# Patient Record
Sex: MALE | Race: BLACK OR AFRICAN AMERICAN | NOT HISPANIC OR LATINO | Employment: OTHER | URBAN - METROPOLITAN AREA
[De-identification: names, ages, dates, MRNs, and addresses within clinical notes are randomized per-mention and may not be internally consistent; named-entity substitution may affect disease eponyms.]

---

## 2021-11-27 ENCOUNTER — HOSPITAL ENCOUNTER (EMERGENCY)
Facility: HOSPITAL | Age: 61
Discharge: HOME | End: 2021-11-27
Attending: EMERGENCY MEDICINE
Payer: MEDICARE

## 2021-11-27 VITALS
OXYGEN SATURATION: 99 % | TEMPERATURE: 99 F | HEIGHT: 65 IN | SYSTOLIC BLOOD PRESSURE: 169 MMHG | BODY MASS INDEX: 31.99 KG/M2 | RESPIRATION RATE: 18 BRPM | WEIGHT: 192 LBS | HEART RATE: 85 BPM | DIASTOLIC BLOOD PRESSURE: 89 MMHG

## 2021-11-27 DIAGNOSIS — M79.89 LEG SWELLING: ICD-10-CM

## 2021-11-27 DIAGNOSIS — R60.9 SWELLING: ICD-10-CM

## 2021-11-27 DIAGNOSIS — M79.89 RIGHT LEG SWELLING: ICD-10-CM

## 2021-11-27 LAB
ALBUMIN SERPL-MCNC: 3.5 G/DL (ref 3.3–5.5)
ALLENS TEST: ABNORMAL
ALP SERPL-CCNC: 76 U/L (ref 42–141)
BILIRUB SERPL-MCNC: 0.4 MG/DL (ref 0.2–1.6)
BUN SERPL-MCNC: 37 MG/DL (ref 7–22)
CALCIUM SERPL-MCNC: 9.7 MG/DL (ref 8–10.3)
CHLORIDE SERPL-SCNC: 107 MMOL/L (ref 98–108)
CREAT SERPL-MCNC: 2.5 MG/DL (ref 0.6–1.2)
CRP SERPL-MCNC: 71.5 MG/L (ref 0–8.2)
ERYTHROCYTE [SEDIMENTATION RATE] IN BLOOD BY WESTERGREN METHOD: 66 MM/HR (ref 0–10)
GLUCOSE SERPL-MCNC: 130 MG/DL (ref 73–118)
HCO3 UR-SCNC: 22.8 MMOL/L (ref 24–28)
LDH SERPL L TO P-CCNC: 0.84 MMOL/L (ref 0.5–2.2)
PCO2 BLDA: 38.1 MMHG (ref 35–45)
PH SMN: 7.38 [PH] (ref 7.35–7.45)
PO2 BLDA: 49 MMHG (ref 40–60)
POC ALT (SGPT): 27 U/L (ref 10–47)
POC AST (SGOT): 29 U/L (ref 11–38)
POC B-TYPE NATRIURETIC PEPTIDE: 11.5 PG/ML (ref 0–100)
POC BE: -2 MMOL/L
POC CARDIAC TROPONIN I: 0 NG/ML
POC D-DI: 531 NG/ML (ref 0–450)
POC SATURATED O2: 84 % (ref 95–100)
POC TCO2: 22 MMOL/L (ref 18–33)
POC TCO2: 24 MMOL/L (ref 24–29)
POTASSIUM BLD-SCNC: 5.5 MMOL/L (ref 3.6–5.1)
PROTEIN, POC: 8.1 G/DL (ref 6.4–8.1)
SAMPLE: ABNORMAL
SAMPLE: NORMAL
SITE: ABNORMAL
SODIUM BLD-SCNC: 140 MMOL/L (ref 128–145)

## 2021-11-27 PROCEDURE — 93005 ELECTROCARDIOGRAM TRACING: CPT | Mod: ER

## 2021-11-27 PROCEDURE — 87040 BLOOD CULTURE FOR BACTERIA: CPT | Performed by: EMERGENCY MEDICINE

## 2021-11-27 PROCEDURE — 80053 COMPREHEN METABOLIC PANEL: CPT | Mod: ER

## 2021-11-27 PROCEDURE — 85652 RBC SED RATE AUTOMATED: CPT | Performed by: EMERGENCY MEDICINE

## 2021-11-27 PROCEDURE — 86140 C-REACTIVE PROTEIN: CPT | Performed by: EMERGENCY MEDICINE

## 2021-11-27 PROCEDURE — 83880 ASSAY OF NATRIURETIC PEPTIDE: CPT | Mod: ER

## 2021-11-27 PROCEDURE — 99284 EMERGENCY DEPT VISIT MOD MDM: CPT | Mod: 25,ER

## 2021-11-27 PROCEDURE — 93010 ELECTROCARDIOGRAM REPORT: CPT | Mod: ,,, | Performed by: INTERNAL MEDICINE

## 2021-11-27 PROCEDURE — 84484 ASSAY OF TROPONIN QUANT: CPT | Mod: ER

## 2021-11-27 PROCEDURE — 25000003 PHARM REV CODE 250: Mod: ER | Performed by: EMERGENCY MEDICINE

## 2021-11-27 PROCEDURE — 82803 BLOOD GASES ANY COMBINATION: CPT | Mod: ER

## 2021-11-27 PROCEDURE — 85379 FIBRIN DEGRADATION QUANT: CPT | Mod: ER

## 2021-11-27 PROCEDURE — 93010 EKG 12-LEAD: ICD-10-PCS | Mod: ,,, | Performed by: INTERNAL MEDICINE

## 2021-11-27 PROCEDURE — 85025 COMPLETE CBC W/AUTO DIFF WBC: CPT | Mod: ER

## 2021-11-27 RX ORDER — AMLODIPINE BESYLATE 10 MG/1
10 TABLET ORAL
COMMUNITY
Start: 2021-11-07 | End: 2022-11-07

## 2021-11-27 RX ORDER — HYDRALAZINE HYDROCHLORIDE 25 MG/1
25 TABLET, FILM COATED ORAL
COMMUNITY
Start: 2021-11-06 | End: 2022-11-06

## 2021-11-27 RX ORDER — SULFAMETHOXAZOLE AND TRIMETHOPRIM 800; 160 MG/1; MG/1
1 TABLET ORAL 2 TIMES DAILY
Qty: 20 TABLET | Refills: 0 | Status: SHIPPED | OUTPATIENT
Start: 2021-11-27 | End: 2021-12-07

## 2021-11-27 RX ORDER — TRAMADOL HYDROCHLORIDE 50 MG/1
50 TABLET ORAL EVERY 6 HOURS PRN
Qty: 12 TABLET | Refills: 0 | Status: SHIPPED | OUTPATIENT
Start: 2021-11-27

## 2021-11-27 RX ORDER — AMOXICILLIN AND CLAVULANATE POTASSIUM 875; 125 MG/1; MG/1
1 TABLET, FILM COATED ORAL 2 TIMES DAILY
Qty: 20 TABLET | Refills: 0 | Status: SHIPPED | OUTPATIENT
Start: 2021-11-27 | End: 2021-12-07

## 2021-11-27 RX ORDER — TRAMADOL HYDROCHLORIDE 50 MG/1
50 TABLET ORAL
Status: COMPLETED | OUTPATIENT
Start: 2021-11-27 | End: 2021-11-27

## 2021-11-27 RX ORDER — CARVEDILOL 12.5 MG/1
12.5 TABLET ORAL
COMMUNITY

## 2021-11-27 RX ORDER — DICLOFENAC SODIUM 10 MG/G
GEL TOPICAL
Qty: 100 G | Refills: 0 | Status: SHIPPED | OUTPATIENT
Start: 2021-11-27

## 2021-11-27 RX ADMIN — TRAMADOL HYDROCHLORIDE 50 MG: 50 TABLET, FILM COATED ORAL at 03:11

## 2021-12-01 LAB
BACTERIA BLD CULT: NORMAL
BACTERIA BLD CULT: NORMAL

## 2024-11-24 ENCOUNTER — HOSPITAL ENCOUNTER (EMERGENCY)
Facility: HOSPITAL | Age: 64
Discharge: HOME | End: 2024-11-25
Attending: EMERGENCY MEDICINE
Payer: MEDICARE

## 2024-11-24 DIAGNOSIS — M19.90 INFLAMMATORY ARTHRITIS: Primary | ICD-10-CM

## 2024-11-24 DIAGNOSIS — N18.9 CHRONIC KIDNEY DISEASE, UNSPECIFIED CKD STAGE: ICD-10-CM

## 2024-11-24 DIAGNOSIS — M25.561 RIGHT KNEE PAIN: ICD-10-CM

## 2024-11-24 DIAGNOSIS — I10 ELEVATED SYSTOLIC BLOOD PRESSURE READING WITH DIAGNOSIS OF HYPERTENSION: ICD-10-CM

## 2024-11-24 LAB
ALBUMIN SERPL-MCNC: 3.5 G/DL (ref 3.3–5.5)
ALLENS TEST: ABNORMAL
ALP SERPL-CCNC: 67 U/L (ref 42–141)
BILIRUB SERPL-MCNC: 1.1 MG/DL (ref 0.2–1.6)
BUN SERPL-MCNC: 30 MG/DL (ref 7–22)
CALCIUM SERPL-MCNC: 9.4 MG/DL (ref 8–10.3)
CHLORIDE SERPL-SCNC: 104 MMOL/L (ref 98–108)
CREAT SERPL-MCNC: 3.7 MG/DL (ref 0.6–1.2)
CRP SERPL-MCNC: 106.9 MG/L (ref 0–8.2)
ERYTHROCYTE [SEDIMENTATION RATE] IN BLOOD BY PHOTOMETRIC METHOD: 77 MM/HR (ref 0–23)
GLUCOSE SERPL-MCNC: 99 MG/DL (ref 73–118)
HCO3 UR-SCNC: 21.1 MMOL/L (ref 24–28)
HCT, POC: NORMAL
HGB, POC: NORMAL (ref 14–18)
LDH SERPL L TO P-CCNC: 1.27 MMOL/L (ref 0.5–2.2)
MCH, POC: NORMAL
MCHC, POC: NORMAL
MCV, POC: NORMAL
MPV, POC: NORMAL
PCO2 BLDA: 37.4 MMHG (ref 35–45)
PH SMN: 7.36 [PH] (ref 7.35–7.45)
PO2 BLDA: 30 MMHG (ref 40–60)
POC ALT (SGPT): 20 U/L (ref 10–47)
POC AST (SGOT): 25 U/L (ref 11–38)
POC BE: -4 MMOL/L
POC PLATELET COUNT: NORMAL
POC SATURATED O2: 56 % (ref 95–100)
POC TCO2: 22 MMOL/L (ref 24–29)
POC TCO2: 24 MMOL/L (ref 18–33)
POCT GLUCOSE: 88 MG/DL (ref 70–110)
POTASSIUM BLD-SCNC: 5.6 MMOL/L (ref 3.6–5.1)
PROTEIN, POC: 7.6 G/DL (ref 6.4–8.1)
RBC, POC: NORMAL
RDW, POC: NORMAL
SAMPLE: ABNORMAL
SITE: ABNORMAL
SODIUM BLD-SCNC: 138 MMOL/L (ref 128–145)
URATE SERPL-MCNC: 9.6 MG/DL (ref 3.4–7)
WBC, POC: NORMAL

## 2024-11-24 PROCEDURE — 84550 ASSAY OF BLOOD/URIC ACID: CPT | Performed by: EMERGENCY MEDICINE

## 2024-11-24 PROCEDURE — 86140 C-REACTIVE PROTEIN: CPT | Performed by: EMERGENCY MEDICINE

## 2024-11-24 PROCEDURE — 80053 COMPREHEN METABOLIC PANEL: CPT | Mod: ER

## 2024-11-24 PROCEDURE — 99285 EMERGENCY DEPT VISIT HI MDM: CPT | Mod: 25,ER

## 2024-11-24 PROCEDURE — 96374 THER/PROPH/DIAG INJ IV PUSH: CPT | Mod: ER

## 2024-11-24 PROCEDURE — 82803 BLOOD GASES ANY COMBINATION: CPT | Mod: ER

## 2024-11-24 PROCEDURE — 82962 GLUCOSE BLOOD TEST: CPT | Mod: ER

## 2024-11-24 PROCEDURE — 85025 COMPLETE CBC W/AUTO DIFF WBC: CPT | Mod: ER

## 2024-11-24 PROCEDURE — 96375 TX/PRO/DX INJ NEW DRUG ADDON: CPT | Mod: ER

## 2024-11-24 PROCEDURE — 85652 RBC SED RATE AUTOMATED: CPT | Performed by: EMERGENCY MEDICINE

## 2024-11-24 PROCEDURE — 25000003 PHARM REV CODE 250: Mod: ER | Performed by: EMERGENCY MEDICINE

## 2024-11-24 PROCEDURE — 63600175 PHARM REV CODE 636 W HCPCS: Mod: ER | Performed by: EMERGENCY MEDICINE

## 2024-11-24 RX ORDER — LIDOCAINE HYDROCHLORIDE 10 MG/ML
10 INJECTION, SOLUTION INFILTRATION; PERINEURAL
Status: COMPLETED | OUTPATIENT
Start: 2024-11-24 | End: 2024-11-25

## 2024-11-24 RX ORDER — TRIAMCINOLONE ACETONIDE 40 MG/ML
40 INJECTION, SUSPENSION INTRA-ARTICULAR; INTRAMUSCULAR
Status: COMPLETED | OUTPATIENT
Start: 2024-11-24 | End: 2024-11-25

## 2024-11-24 RX ORDER — AMLODIPINE BESYLATE 5 MG/1
10 TABLET ORAL
Status: COMPLETED | OUTPATIENT
Start: 2024-11-24 | End: 2024-11-24

## 2024-11-24 RX ORDER — HYDRALAZINE HYDROCHLORIDE 20 MG/ML
10 INJECTION INTRAMUSCULAR; INTRAVENOUS
Status: COMPLETED | OUTPATIENT
Start: 2024-11-24 | End: 2024-11-24

## 2024-11-24 RX ORDER — MORPHINE SULFATE 4 MG/ML
8 INJECTION, SOLUTION INTRAMUSCULAR; INTRAVENOUS
Status: COMPLETED | OUTPATIENT
Start: 2024-11-24 | End: 2024-11-24

## 2024-11-24 RX ORDER — DEXAMETHASONE SODIUM PHOSPHATE 4 MG/ML
12 INJECTION, SOLUTION INTRA-ARTICULAR; INTRALESIONAL; INTRAMUSCULAR; INTRAVENOUS; SOFT TISSUE
Status: COMPLETED | OUTPATIENT
Start: 2024-11-24 | End: 2024-11-24

## 2024-11-24 RX ADMIN — MORPHINE SULFATE 8 MG: 4 INJECTION INTRAVENOUS at 09:11

## 2024-11-24 RX ADMIN — DEXAMETHASONE SODIUM PHOSPHATE 12 MG: 4 INJECTION INTRA-ARTICULAR; INTRALESIONAL; INTRAMUSCULAR; INTRAVENOUS; SOFT TISSUE at 09:11

## 2024-11-24 RX ADMIN — HYDRALAZINE HYDROCHLORIDE 10 MG: 20 INJECTION INTRAMUSCULAR; INTRAVENOUS at 09:11

## 2024-11-24 RX ADMIN — AMLODIPINE BESYLATE 10 MG: 5 TABLET ORAL at 09:11

## 2024-11-24 NOTE — Clinical Note
Shanell Akhtar accompanied their spouse to the emergency department on 11/24/2024. They may return to work on 11/26/2024.      If you have any questions or concerns, please don't hesitate to call.      Navya Holland MD

## 2024-11-24 NOTE — Clinical Note
Shanell Akhtar accompanied their spouse to the emergency department on 11/24/2024. They may return to work on 11/26/2024.      If you have any questions or concerns, please don't hesitate to call.      Navya Holland RN

## 2024-11-25 VITALS
WEIGHT: 192 LBS | DIASTOLIC BLOOD PRESSURE: 96 MMHG | RESPIRATION RATE: 20 BRPM | OXYGEN SATURATION: 100 % | SYSTOLIC BLOOD PRESSURE: 195 MMHG | TEMPERATURE: 100 F | HEART RATE: 87 BPM | BODY MASS INDEX: 31.99 KG/M2 | HEIGHT: 65 IN

## 2024-11-25 LAB
APPEARANCE FLD: NORMAL
BODY FLD TYPE: NORMAL
BODY FLD TYPE: NORMAL
COLOR FLD: YELLOW
CRYSTALS FLD MICRO: NEGATIVE
LYMPHOCYTES NFR FLD MANUAL: 4 %
NEUTROPHILS NFR FLD MANUAL: 96 %
WBC # FLD: NORMAL /CU MM

## 2024-11-25 PROCEDURE — 87075 CULTR BACTERIA EXCEPT BLOOD: CPT | Performed by: EMERGENCY MEDICINE

## 2024-11-25 PROCEDURE — 20610 DRAIN/INJ JOINT/BURSA W/O US: CPT | Mod: RT,ER

## 2024-11-25 PROCEDURE — 87205 SMEAR GRAM STAIN: CPT | Performed by: EMERGENCY MEDICINE

## 2024-11-25 PROCEDURE — 89060 EXAM SYNOVIAL FLUID CRYSTALS: CPT | Performed by: EMERGENCY MEDICINE

## 2024-11-25 PROCEDURE — 63600175 PHARM REV CODE 636 W HCPCS: Mod: ER | Performed by: EMERGENCY MEDICINE

## 2024-11-25 PROCEDURE — 89051 BODY FLUID CELL COUNT: CPT | Performed by: EMERGENCY MEDICINE

## 2024-11-25 PROCEDURE — 96375 TX/PRO/DX INJ NEW DRUG ADDON: CPT | Mod: ER

## 2024-11-25 PROCEDURE — 87070 CULTURE OTHR SPECIMN AEROBIC: CPT | Performed by: EMERGENCY MEDICINE

## 2024-11-25 RX ORDER — DICLOFENAC SODIUM 10 MG/G
2 GEL TOPICAL 4 TIMES DAILY
Qty: 100 G | Refills: 0 | Status: SHIPPED | OUTPATIENT
Start: 2024-11-25 | End: 2024-12-25

## 2024-11-25 RX ORDER — PREDNISONE 20 MG/1
40 TABLET ORAL DAILY
Qty: 10 TABLET | Refills: 0 | Status: SHIPPED | OUTPATIENT
Start: 2024-11-25 | End: 2024-11-30

## 2024-11-25 RX ORDER — CEFAZOLIN SODIUM 1 G/3ML
2 INJECTION, POWDER, FOR SOLUTION INTRAMUSCULAR; INTRAVENOUS
Status: DISCONTINUED | OUTPATIENT
Start: 2024-11-25 | End: 2024-11-25

## 2024-11-25 RX ORDER — LABETALOL HYDROCHLORIDE 5 MG/ML
10 INJECTION, SOLUTION INTRAVENOUS
Status: COMPLETED | OUTPATIENT
Start: 2024-11-25 | End: 2024-11-25

## 2024-11-25 RX ADMIN — TRIAMCINOLONE ACETONIDE 40 MG: 40 INJECTION, SUSPENSION INTRA-ARTICULAR; INTRAMUSCULAR at 01:11

## 2024-11-25 RX ADMIN — LIDOCAINE HYDROCHLORIDE 10 ML: 10 INJECTION, SOLUTION INFILTRATION; PERINEURAL at 01:11

## 2024-11-25 RX ADMIN — LABETALOL HYDROCHLORIDE 10 MG: 5 INJECTION INTRAVENOUS at 01:11

## 2024-11-25 NOTE — ED PROVIDER NOTES
Encounter Date: 11/24/2024    SCRIBE #1 NOTE: I, Dominick Gilbert Do, am scribing for, and in the presence of,  Dianna Estrella MD. I have scribed the following portions of the note - Other sections scribed: HPI, ROS, PE.       History     Chief Complaint   Patient presents with    Knee Pain     Pt c/o pain in R knee which he believes is due to a gout flare up     64 year old male with asthma, HTN, type 2 diabetes, CKD IV, CAD, and gout, presents to the ED with complaints of acute-on-chronic right knee pain onset yesterday. Patient reports right knee swelling, right thigh pain with moving, and subjective fever. Patient notes previous similar gout flare-ups to this right knee. He reports bending over prior to this episode, endorses this possibly caused this flare-up. He denies any recent falls or injury. He reports being unable to bear weight on his right leg. Patient is unsure of his blood pressure at-home, however he reports his blood pressure is controlled. Patient reports adherence with his anti-hypertensive medication everyday that he does not recall, however he denies taking the anti-hypertensive medication today. He denies taking any current uric acid-lowering medications, NSAID's, corticosteroids, colchicine, or Anakinra. He denies any insulin injections or antidiabetic medications    The history is provided by the patient. No  was used.     Review of patient's allergies indicates:   Allergen Reactions    Acetaminophen Nausea And Vomiting     Past Medical History:   Diagnosis Date    Glucose intolerance     Hypertension      History reviewed. No pertinent surgical history.  No family history on file.  Social History     Tobacco Use    Smoking status: Every Day     Types: Cigarettes    Smokeless tobacco: Never     Review of Systems   Constitutional:  Positive for fever.   Musculoskeletal:  Positive for arthralgias, joint swelling and myalgias.   All other systems reviewed and are  negative.      Physical Exam     Initial Vitals [11/24/24 1940]   BP Pulse Resp Temp SpO2   (!) 224/114 94 20 99.6 °F (37.6 °C) 100 %      MAP       --         Physical Exam    Nursing note and vitals reviewed.  Constitutional: He appears well-developed and well-nourished. He is not diaphoretic. No distress.   HENT:   Head: Normocephalic and atraumatic.   Right Ear: External ear normal.   Left Ear: External ear normal.   Nose: Nose normal.   Eyes: Conjunctivae and lids are normal.   Neck: Trachea normal and phonation normal. Neck supple. No stridor present.   Normal range of motion.  Cardiovascular:  Normal rate, regular rhythm and normal heart sounds.           No murmur heard.  Pulses:       Dorsalis pedis pulses are 2+ on the right side.   Pulmonary/Chest: Breath sounds normal. No accessory muscle usage or stridor. No tachypnea. No respiratory distress.   Musculoskeletal:      Cervical back: Normal range of motion and neck supple.      Right upper leg: Normal.      Right knee: Effusion present. No erythema. Decreased range of motion (limited due to pain).      Right lower leg: Normal.      Comments: No right knee induration.      Neurological: He is alert.   Skin: Skin is warm, dry and intact.         ED Course   Arthrocentesis    Date/Time: 11/25/2024 1:11 AM  Location procedure was performed: [facility] EMERGENCY DEPARTMENT    Performed by: Dianna Estrella MD  Authorized by: Dianna Estrella MD  Consent Done: Yes  Consent: Verbal consent obtained. Written consent obtained.  Risks and benefits: risks, benefits and alternatives were discussed  Consent given by: patient  Patient understanding: patient states understanding of the procedure being performed  Patient consent: the patient's understanding of the procedure matches consent given  Procedure consent: procedure consent matches procedure scheduled  Relevant documents: relevant documents present and verified  Test results: test results available and properly  "labeled  Site marked: the operative site was marked  Imaging studies: imaging studies available  Patient identity confirmed: , name and verbally with patient  Time out: Immediately prior to procedure a "time out" was called to verify the correct patient, procedure, equipment, support staff and site/side marked as required.  Indications: joint swelling, pain, possible septic joint and diagnostic evaluation   Body area: knee  Joint: right knee  Local anesthesia used: yes  Anesthesia: local infiltration    Anesthesia:  Local anesthesia used: yes  Local Anesthetic: lidocaine 1% without epinephrine  Anesthetic total: 2 mL    Patient sedated: no  Preparation: Patient was prepped and draped in the usual sterile fashion.  Needle size: 18 G  Approach: lateral  Aspirate amount: 30 mL  Aspirate: yellow and clear  Triamcinolone amount: 40 mg  Patient tolerance: Patient tolerated the procedure well with no immediate complications  Complications: No  Estimated blood loss (mL): 0  Specimens: Yes (joint fluid sent for analysis)  Comments: Neurovascular status intact before and after procedure.      Critical Care    Date/Time: 2024 1:11 AM    Performed by: Dianna Estrella MD  Authorized by: Dianna Estrella MD  Direct patient critical care time: 10 minutes  Ordering / reviewing critical care time: 10 minutes  Documentation critical care time: 10 minutes  Consulting other physicians critical care time: 10 minutes  Total critical care time (exclusive of procedural time) : 40 minutes  Critical care was necessary to treat or prevent imminent or life-threatening deterioration of the following conditions: renal failure and sepsis.  Critical care was time spent personally by me on the following activities: discussions with consultants, development of treatment plan with patient or surrogate, examination of patient, ordering and performing treatments and interventions, re-evaluation of patient's condition, ordering and review of " laboratory studies, obtaining history from patient or surrogate, evaluation of patient's response to treatment, review of old charts and ordering and review of radiographic studies.        Labs Reviewed   SEDIMENTATION RATE - Abnormal       Result Value    Sed Rate 77 (*)    C-REACTIVE PROTEIN - Abnormal    .9 (*)    URIC ACID - Abnormal    Uric Acid 9.6 (*)    ISTAT PROCEDURE - Abnormal    POC PH 7.358      POC PCO2 37.4      POC PO2 30 (*)     POC HCO3 21.1 (*)     POC BE -4 (*)     POC SATURATED O2 56      POC Lactate 1.27      POC TCO2 22 (*)     Sample VENOUS      Site Other      Allens Test N/A     POCT CMP - Abnormal    Albumin, POC 3.5      Alkaline Phosphatase, POC 67      ALT (SGPT), POC 20      AST (SGOT), POC 25      POC BUN 30 (*)     Calcium, POC 9.4      POC Chloride 104      POC Creatinine 3.7 (*)     POC Glucose 99      POC Potassium 5.6 (*)     POC Sodium 138      Bilirubin, POC 1.1      POC TCO2 24      Protein, POC 7.6     CULTURE, FLUID  (AEROBIC) WITH GRAM STAIN    AEROBIC CULTURE - FLUID No growth      Gram Stain Result Cytospin indicates:      Gram Stain Result No epithelial cells      Gram Stain Result Many WBC's      Gram Stain Result No organisms seen      Gram Stain Result 11/25/2024 04:37 BML      Narrative:     Joint Fluid   CULTURE, ANAEROBIC    Anaerobic Culture No anaerobes isolated     BODY FLUID CRYSTAL    Body Fluid Source, Crystal Exam Joint Fluid, Right Knee      Body Fluid Crystal Negative      Narrative:     Joint Fluid   WBC & DIFF, BODY FLUID    Body Fluid Type Joint Fluid, Right Knee      Fluid Appearance Turbid      Fluid Color Yellow      WBC, Body Fluid 43918      Segs, Fluid 96      Lymphs, Fluid 4     FREEZE AND HOLD -    POCT CBC    Hematocrit        Hemoglobin        RBC        WBC        MCV        MCH, POC        MCHC        RDW-CV        Platelet Count, POC        MPV       POCT GLUCOSE    POCT Glucose 88     POCT CMP          Imaging Results               X-Ray Knee 1 or 2 View Right (Final result)  Result time 11/24/24 22:34:28   Procedure changed from X-Ray Knee 3 View Right     Final result by Marcelle Smith MD (11/24/24 22:34:28)                   Impression:      No acute bony abnormality detected.      Electronically signed by: Marcelle Smith  Date:    11/24/2024  Time:    22:34               Narrative:    EXAMINATION:  TWO VIEWS OF THE RIGHT KNEE    CLINICAL HISTORY:  Pain in right knee    TECHNIQUE:  AP and lateral view of the right knee    COMPARISON:  11/27/2021    FINDINGS:  Two views of the right knee demonstrate no acute fracture or dislocation.  Moderate suprapatellar effusion is present.                                       Medications   dexAMETHasone injection 12 mg (12 mg Intravenous Given 11/24/24 2158)   morphine injection 8 mg (8 mg Intravenous Given 11/24/24 2150)   amLODIPine tablet 10 mg (10 mg Oral Given 11/24/24 2117)   hydrALAZINE injection 10 mg (10 mg Intravenous Given 11/24/24 2154)   LIDOcaine HCL 10 mg/ml (1%) injection 10 mL (10 mLs Intradermal Given 11/25/24 0101)   triamcinolone acetonide injection 40 mg (40 mg Intra-articular Given 11/25/24 0109)   labetaloL injection 10 mg (10 mg Intravenous Given 11/25/24 0126)     Medical Decision Making    Chart Review     Latest Reference Range & Units Most Recent 11/05/21 07:13 11/05/21 13:20 11/06/21 06:06 07/12/22 10:22   Creatinine 0.70 - 1.30 mg/dL 2.56 (H) (E)  7/12/22 10:22 2.73 (H) (E) 2.75 (H) (E) 2.61 (H) (E) 2.56 (H) (E)   BUN/CREAT RATIO  12 (E)  7/12/22 10:22 13 (E) 11 (E) 13 (E) 12 (E)   eGFR >89 mL/min 26 (L) (E)  7/12/22 10:22 24 (L) (E) 24 (L) (E) 25 (L) (E) 26 (L) (E)   (H): Data is abnormally high  (L): Data is abnormally low  (E): External lab result    Labs Reviewed        Admission on 11/24/2024, Discharged on 11/25/2024   Component Date Value Ref Range Status    POCT Glucose 11/24/2024 88  70 - 110 mg/dL Final    Sed Rate 11/24/2024 77 (H)  0 - 23 mm/Hr  Corrected    Comment: Manual ESR performed at [facility]:  Normal range:  Male   0-10 mm/Hr  Female 0-20 mm/Hr  CORRECTED RESULT; previously reported as 77 on 11/24/2024 at 23:49.      CRP 11/24/2024 106.9 (H)  0.0 - 8.2 mg/L Final    Uric Acid 11/24/2024 9.6 (H)  3.4 - 7.0 mg/dL Final    POC PH 11/24/2024 7.358  7.35 - 7.45 Final    POC PCO2 11/24/2024 37.4  35 - 45 mmHg Final    POC PO2 11/24/2024 30 (LL)  40 - 60 mmHg Final    POC HCO3 11/24/2024 21.1 (L)  24 - 28 mmol/L Final    POC BE 11/24/2024 -4 (L)  -2 to 2 mmol/L Final    POC SATURATED O2 11/24/2024 56  95 - 100 % Final    POC Lactate 11/24/2024 1.27  0.5 - 2.2 mmol/L Final    POC TCO2 11/24/2024 22 (L)  24 - 29 mmol/L Final    Sample 11/24/2024 VENOUS   Final    Site 11/24/2024 Other   Final    Allens Test 11/24/2024 N/A   Final    Albumin, POC 11/24/2024 3.5  3.3 - 5.5 g/dL Final    Alkaline Phosphatase, POC 11/24/2024 67  42 - 141 U/L Final    ALT (SGPT), POC 11/24/2024 20  10 - 47 U/L Final    AST (SGOT), POC 11/24/2024 25  11 - 38 U/L Final    POC BUN 11/24/2024 30 (H)  7 - 22 mg/dL Final    Calcium, POC 11/24/2024 9.4  8.0 - 10.3 mg/dL Final    POC Chloride 11/24/2024 104  98 - 108 mmol/L Final    POC Creatinine 11/24/2024 3.7 (H)  0.6 - 1.2 mg/dL Final    POC Glucose 11/24/2024 99  73 - 118 mg/dL Final    POC Potassium 11/24/2024 5.6 (H)  3.6 - 5.1 mmol/L Final    POC Sodium 11/24/2024 138  128 - 145 mmol/L Final    Bilirubin, POC 11/24/2024 1.1  0.2 - 1.6 mg/dL Final    POC TCO2 11/24/2024 24  18 - 33 mmol/L Final    Protein, POC 11/24/2024 7.6  6.4 - 8.1 g/dL Final    Body Fluid Source, Crystal Exam 11/25/2024 Joint Fluid, Right Knee   Final    Body Fluid Crystal 11/25/2024 Negative  Negative Final    AEROBIC CULTURE - FLUID 11/25/2024 No growth   Final    Gram Stain Result 11/25/2024 Cytospin indicates:   Final    Gram Stain Result 11/25/2024 No epithelial cells   Final    Gram Stain Result 11/25/2024 Many WBC's   Final    Gram Stain Result  11/25/2024 No organisms seen   Final    Gram Stain Result 11/25/2024 11/25/2024 04:37 BML   Final    Anaerobic Culture 11/25/2024 No anaerobes isolated   Final    Body Fluid Type 11/25/2024 Joint Fluid, Right Knee   Final    Fluid Appearance 11/25/2024 Turbid   Final    Fluid Color 11/25/2024 Yellow   Final    WBC, Body Fluid 11/25/2024 41507  /cu mm Final    Comment: Reference ranges for body fluids not established.   Correlate clinically.      Segs, Fluid 11/25/2024 96  % Final    Lymphs, Fluid 11/25/2024 4  % Final        Imaging Reviewed    Imaging Results              X-Ray Knee 1 or 2 View Right (Final result)  Result time 11/24/24 22:34:28   Procedure changed from X-Ray Knee 3 View Right     Final result by Marcelle Smith MD (11/24/24 22:34:28)                   Impression:      No acute bony abnormality detected.      Electronically signed by: Marcelle Smith  Date:    11/24/2024  Time:    22:34               Narrative:    EXAMINATION:  TWO VIEWS OF THE RIGHT KNEE    CLINICAL HISTORY:  Pain in right knee    TECHNIQUE:  AP and lateral view of the right knee    COMPARISON:  11/27/2021    FINDINGS:  Two views of the right knee demonstrate no acute fracture or dislocation.  Moderate suprapatellar effusion is present.                                      Medications given in ED    Medications   dexAMETHasone injection 12 mg (12 mg Intravenous Given 11/24/24 2158)   morphine injection 8 mg (8 mg Intravenous Given 11/24/24 2150)   amLODIPine tablet 10 mg (10 mg Oral Given 11/24/24 2117)   hydrALAZINE injection 10 mg (10 mg Intravenous Given 11/24/24 2154)   LIDOcaine HCL 10 mg/ml (1%) injection 10 mL (10 mLs Intradermal Given 11/25/24 0101)   triamcinolone acetonide injection 40 mg (40 mg Intra-articular Given 11/25/24 0109)   labetaloL injection 10 mg (10 mg Intravenous Given 11/25/24 0126)         Note was created using voice recognition software. Note may have occasional typographical errors that may not  have been identified and edited despite good mecca initial review prior to signing.    I, Dianna Estrella MD, personally performed the services described in this documentation. All medical record entries made by the scribe were at my direction and in my presence.  I have reviewed the chart and agree that the record reflects my personal performance and is accurate and complete.            Scribe Attestation:   Scribe #1: I performed the above scribed service and the documentation accurately describes the services I performed. I attest to the accuracy of the note.        ED Course as of 11/30/24 2312   Mon Nov 25, 2024   0406 Case discussed with Ortho, since WBC less than 50K, septic arthritis excluded. [DL]      ED Course User Index  [DL] Dianna Estrella MD               Medical Decision Making:   Differential Diagnosis:   Fracture, contusion, dislocation, sprain, strain, spasm, arthritis (inflammatory vs infections vs reactive), bursitis, cellulitis, and others    Clinical Tests:   Lab Tests: Ordered and Reviewed  Radiological Study: Ordered and Reviewed  ED Management:  64 y.o. male patient with diabetes, hypertension, coronary artery disease, tobacco abuse, CKD and others presents emergency department complaining of acute, right knee pain and swelling x 1 day.  Patient reports onset of pain when attempting to bend his knee.  Patient with oral temperature of 99.6° on arrival with elevated blood pressure 220s/110s, On exam, there is significant swelling of the right knee with warmth without erythema, induration or fluctuance.  Right knee range of motion is limited due to pain.  White count 15.5, Lactic acid 1.27,ESR 77, .9, uric acid 9.6, creatinine 3.7, BUN 30, potassium 5.6, Right knee XR with moderate suprapatellar effusion otherwise negative for acute abnormality.  Synovial fluid analysis of the right knee:  Turbid appearance, yellow, white blood cell 11238, 96% segs, 4% lymphs, crystals negative, gm stain  - no growth, synovial fluid cultures pending at time of ED disposition. Findings consistent with inflammatory arthritis. Septic arthritis ruled out.  Lab results, imaging results, outpatient management plan, outpatient PCP/ortho/nephrology follow up and ED return precautions discussed with patient with understanding and agreement.              Vitals:    11/24/24 2247 11/25/24 0126 11/25/24 0150 11/25/24 0400   BP: (!) 201/98 (!) 212/102 (!) 188/100 (!) 195/96   BP Location:       Pulse: 105  85 87   Resp:       Temp:       TempSrc:       SpO2: 100%  99% 100%   Weight:       Height:             Clinical Impression:  Final diagnoses:  [M25.561] Right knee pain  [M19.90] Inflammatory arthritis (Primary)  [I10] Elevated systolic blood pressure reading with diagnosis of hypertension  [N18.9] Chronic kidney disease, unspecified CKD stage          ED Disposition Condition    Discharge Stable          ED Prescriptions       Medication Sig Dispense Start Date End Date Auth. Provider    predniSONE (DELTASONE) 20 MG tablet (Expires today) Take 2 tablets (40 mg total) by mouth once daily. for 5 days 10 tablet 11/25/2024 11/30/2024 Dianna Estrella MD    diclofenac sodium (VOLTAREN) 1 % Gel Apply 2 g topically 4 (four) times daily. 100 g 11/25/2024 12/25/2024 Dianna Estrella MD          Follow-up Information       Follow up With Specialties Details Why Contact Info Additional Information    The nearest emergency department.  Go to  As needed, If symptoms worsen      Your PCP  Call  As needed, for ongoing care      [facility] Nephrology Nephrology Call today to schedule an appointment, for re-evaluation of today's complaint, and ongoing care [id number] [address] [phone] 2nd Floor    [facility] - Orthopedics Orthopedics Call today to schedule an appointment, for re-evaluation of today's complaint [address] [address] [phone] Please park in surface lot and use  [facility] entrance. Check in at main registration.              Dianna Estrella MD  11/30/24 0854

## 2024-11-28 LAB
BACTERIA FLD AEROBE CULT: NO GROWTH
GRAM STN SPEC: NORMAL

## 2024-11-29 LAB — BACTERIA SPEC ANAEROBE CULT: NORMAL

## 2025-06-09 ENCOUNTER — HOSPITAL ENCOUNTER (EMERGENCY)
Facility: HOSPITAL | Age: 65
Discharge: HOME | End: 2025-06-09
Attending: INTERNAL MEDICINE
Payer: MEDICARE

## 2025-06-09 VITALS
HEIGHT: 65 IN | HEART RATE: 81 BPM | TEMPERATURE: 99 F | SYSTOLIC BLOOD PRESSURE: 172 MMHG | OXYGEN SATURATION: 99 % | RESPIRATION RATE: 18 BRPM | DIASTOLIC BLOOD PRESSURE: 76 MMHG | BODY MASS INDEX: 30.93 KG/M2 | WEIGHT: 185.63 LBS

## 2025-06-09 DIAGNOSIS — I10 HYPERTENSION, UNSPECIFIED TYPE: ICD-10-CM

## 2025-06-09 DIAGNOSIS — F17.210 CONTINUOUS DEPENDENCE ON CIGARETTE SMOKING: ICD-10-CM

## 2025-06-09 DIAGNOSIS — J06.9 VIRAL URI WITH COUGH: Primary | ICD-10-CM

## 2025-06-09 PROCEDURE — 99284 EMERGENCY DEPT VISIT MOD MDM: CPT | Mod: ER

## 2025-06-09 PROCEDURE — 25000003 PHARM REV CODE 250: Mod: ER | Performed by: INTERNAL MEDICINE

## 2025-06-09 RX ORDER — FLUTICASONE PROPIONATE 50 MCG
2 SPRAY, SUSPENSION (ML) NASAL DAILY
Qty: 15 G | Refills: 0 | Status: SHIPPED | OUTPATIENT
Start: 2025-06-09

## 2025-06-09 RX ORDER — HYDRALAZINE HYDROCHLORIDE 25 MG/1
100 TABLET, FILM COATED ORAL
Status: COMPLETED | OUTPATIENT
Start: 2025-06-09 | End: 2025-06-09

## 2025-06-09 RX ORDER — IBUPROFEN 800 MG/1
800 TABLET, FILM COATED ORAL EVERY 8 HOURS PRN
Qty: 30 TABLET | Refills: 0 | Status: SHIPPED | OUTPATIENT
Start: 2025-06-09

## 2025-06-09 RX ORDER — HYDRALAZINE HYDROCHLORIDE 25 MG/1
25 TABLET, FILM COATED ORAL 3 TIMES DAILY
Qty: 90 TABLET | Refills: 11 | Status: SHIPPED | OUTPATIENT
Start: 2025-06-09 | End: 2026-06-09

## 2025-06-09 RX ORDER — IBUPROFEN 400 MG/1
800 TABLET, FILM COATED ORAL
Status: COMPLETED | OUTPATIENT
Start: 2025-06-09 | End: 2025-06-09

## 2025-06-09 RX ORDER — AZELASTINE 1 MG/ML
2 SPRAY, METERED NASAL 2 TIMES DAILY
Qty: 30 ML | Refills: 0 | Status: SHIPPED | OUTPATIENT
Start: 2025-06-09 | End: 2025-08-03

## 2025-06-09 RX ADMIN — IBUPROFEN 800 MG: 400 TABLET ORAL at 03:06

## 2025-06-09 RX ADMIN — HYDRALAZINE HYDROCHLORIDE 100 MG: 25 TABLET ORAL at 03:06

## 2025-06-09 NOTE — ED PROVIDER NOTES
Encounter Date: 6/9/2025       History     Chief Complaint   Patient presents with    Cough     Productive white cough x 1 week with congestion.      64-year-old male presents to emergency department complaining of productive cough with white sputum x1 week.  Also complains of nasal congestion.  Endorses cigarette smoking.  Denies fever/chills/nausea/vomiting/chest pain/shortness breath.    The history is provided by the patient. No  was used.     Review of patient's allergies indicates:   Allergen Reactions    Acetaminophen Nausea And Vomiting     Past Medical History:   Diagnosis Date    Glucose intolerance     Hypertension      History reviewed. No pertinent surgical history.  No family history on file.  Social History[1]  Review of Systems   Constitutional:  Negative for fever.   HENT:  Positive for congestion and rhinorrhea.    Respiratory:  Positive for cough. Negative for wheezing.    Gastrointestinal:  Negative for vomiting.   All other systems reviewed and are negative.      Physical Exam     Initial Vitals [06/09/25 0244]   BP Pulse Resp Temp SpO2   (!) 227/115 102 18 100 °F (37.8 °C) 98 %      MAP       --         Physical Exam    Nursing note and vitals reviewed.  Constitutional: He is not diaphoretic. No distress.   HENT:   Head: Normocephalic and atraumatic.   Right Ear: External ear normal.   Left Ear: External ear normal.   Clear nasal discharge, clear postnasal drip, posterior oropharyngeal erythema without exudate or edema   Eyes: Conjunctivae are normal.   Neck:   Normal range of motion.  Cardiovascular:  Normal rate and regular rhythm.           Pulmonary/Chest: Breath sounds normal. No respiratory distress.   Musculoskeletal:      Cervical back: Normal range of motion.     Neurological: He is alert.   Skin: Skin is warm and dry. Capillary refill takes less than 2 seconds.   Psychiatric: He has a normal mood and affect.         ED Course   Procedures  Labs Reviewed - No data  to display       Imaging Results    None          Medications   hydrALAZINE tablet 100 mg (100 mg Oral Given 6/9/25 0309)   ibuprofen tablet 800 mg (800 mg Oral Given 6/9/25 0308)     Medical Decision Making  64-year-old male presents to emergency department complaining of productive cough with white sputum x1 week.  Also complains of nasal congestion.  Endorses cigarette smoking.  Denies fever/chills/nausea/vomiting/chest pain/shortness breath.  Course of ED stay:   Blood pressure was markedly elevated.  Hydralazine was given in the ED and blood pressure improved prior to discharge.  Exam otherwise reveals evidence of viral URI.  Patient was counseled on hypertension and viral URI.  Prescriptions for hydralazine/Astelin/fluticasone/ibuprofen were given and the patient was advised to follow up with his primary care physician within the next week for re-evaluation/return to the emergency department if condition worsens.    Risk  Prescription drug management.                                      Clinical Impression:  Final diagnoses:  [J06.9] Viral URI with cough (Primary)  [F17.210] Continuous dependence on cigarette smoking  [I10] Hypertension, unspecified type          ED Disposition Condition    Discharge Stable          ED Prescriptions       Medication Sig Dispense Start Date End Date Auth. Provider    hydrALAZINE (APRESOLINE) 25 MG tablet Take 1 tablet (25 mg total) by mouth 3 (three) times daily. 90 tablet 6/9/2025 6/9/2026 David Rain MD    azelastine (ASTELIN) 137 mcg (0.1 %) nasal spray 2 sprays (274 mcg total) by Nasal route 2 (two) times daily. 30 mL 6/9/2025 8/3/2025 David Rain MD    fluticasone propionate (FLONASE) 50 mcg/actuation nasal spray 2 sprays (100 mcg total) by Each Nostril route once daily. 15 g 6/9/2025 -- David Rain MD    ibuprofen (ADVIL,MOTRIN) 800 MG tablet Take 1 tablet (800 mg total) by mouth every 8 (eight) hours as needed for Pain. 30 tablet 6/9/2025 -- Koffi  David ELIZONDO MD          Follow-up Information       Follow up With Specialties Details Why Contact Info    Aditya Laboy MD Internal Medicine Schedule an appointment as soon as possible for a visit in 1 day For reevaluation [address] [place] [address]  [address]  [place] [address]  [phone]                     [1]   Social History  Tobacco Use    Smoking status: Every Day     Types: Cigarettes    Smokeless tobacco: Never   Vaping Use    Vaping status: Never Used   Substance Use Topics    Alcohol use: Not Currently    Drug use: Not Currently        David Rain MD  06/09/25 0545

## 2025-06-09 NOTE — Clinical Note
GHADA GONZALES accompanied their spouse to the emergency department on 6/9/2025. They may return to work on 06/10/2025.      If you have any questions or concerns, please don't hesitate to call.      DAY MIGUEL RN